# Patient Record
Sex: MALE | Race: WHITE | NOT HISPANIC OR LATINO | URBAN - METROPOLITAN AREA
[De-identification: names, ages, dates, MRNs, and addresses within clinical notes are randomized per-mention and may not be internally consistent; named-entity substitution may affect disease eponyms.]

---

## 2023-10-30 ENCOUNTER — HOSPITAL ENCOUNTER (EMERGENCY)
Facility: HOSPITAL | Age: 48
Discharge: HOME | End: 2023-10-30
Attending: EMERGENCY MEDICINE | Admitting: EMERGENCY MEDICINE

## 2023-10-30 VITALS
HEART RATE: 89 BPM | HEIGHT: 73 IN | BODY MASS INDEX: 22.53 KG/M2 | SYSTOLIC BLOOD PRESSURE: 130 MMHG | RESPIRATION RATE: 16 BRPM | OXYGEN SATURATION: 98 % | DIASTOLIC BLOOD PRESSURE: 88 MMHG | TEMPERATURE: 98 F | WEIGHT: 170 LBS

## 2023-10-30 DIAGNOSIS — R45.851 SUICIDAL IDEATION: Primary | ICD-10-CM

## 2023-10-30 LAB
ALBUMIN SERPL-MCNC: 4.6 G/DL (ref 3.5–5.2)
ALBUMIN/GLOB SERPL: 2.1 G/DL
ALP SERPL-CCNC: 47 U/L (ref 39–117)
ALT SERPL W P-5'-P-CCNC: 24 U/L (ref 1–41)
AMPHET+METHAMPHET UR QL: NEGATIVE
AMPHETAMINES UR QL: NEGATIVE
ANION GAP SERPL CALCULATED.3IONS-SCNC: 10 MMOL/L (ref 5–15)
APAP SERPL-MCNC: <5 MCG/ML (ref 0–30)
AST SERPL-CCNC: 22 U/L (ref 1–40)
BARBITURATES UR QL SCN: NEGATIVE
BASOPHILS # BLD AUTO: 0.01 10*3/MM3 (ref 0–0.2)
BASOPHILS NFR BLD AUTO: 0.2 % (ref 0–1.5)
BENZODIAZ UR QL SCN: NEGATIVE
BILIRUB SERPL-MCNC: 0.4 MG/DL (ref 0–1.2)
BILIRUB UR QL STRIP: NEGATIVE
BUN SERPL-MCNC: 12 MG/DL (ref 6–20)
BUN/CREAT SERPL: 13.3 (ref 7–25)
BUPRENORPHINE SERPL-MCNC: NEGATIVE NG/ML
CALCIUM SPEC-SCNC: 9.3 MG/DL (ref 8.6–10.5)
CANNABINOIDS SERPL QL: NEGATIVE
CHLORIDE SERPL-SCNC: 103 MMOL/L (ref 98–107)
CLARITY UR: CLEAR
CO2 SERPL-SCNC: 24 MMOL/L (ref 22–29)
COCAINE UR QL: NEGATIVE
COLOR UR: YELLOW
CREAT SERPL-MCNC: 0.9 MG/DL (ref 0.76–1.27)
DEPRECATED RDW RBC AUTO: 41.1 FL (ref 37–54)
EGFRCR SERPLBLD CKD-EPI 2021: 106.7 ML/MIN/1.73
EOSINOPHIL # BLD AUTO: 0.02 10*3/MM3 (ref 0–0.4)
EOSINOPHIL NFR BLD AUTO: 0.3 % (ref 0.3–6.2)
ERYTHROCYTE [DISTWIDTH] IN BLOOD BY AUTOMATED COUNT: 12 % (ref 12.3–15.4)
ETHANOL BLD-MCNC: <10 MG/DL (ref 0–10)
FENTANYL UR-MCNC: NEGATIVE NG/ML
GLOBULIN UR ELPH-MCNC: 2.2 GM/DL
GLUCOSE SERPL-MCNC: 108 MG/DL (ref 65–99)
GLUCOSE UR STRIP-MCNC: NEGATIVE MG/DL
HCT VFR BLD AUTO: 40.8 % (ref 37.5–51)
HGB BLD-MCNC: 14 G/DL (ref 13–17.7)
HGB UR QL STRIP.AUTO: NEGATIVE
HOLD SPECIMEN: NORMAL
IMM GRANULOCYTES # BLD AUTO: 0.01 10*3/MM3 (ref 0–0.05)
IMM GRANULOCYTES NFR BLD AUTO: 0.2 % (ref 0–0.5)
KETONES UR QL STRIP: NEGATIVE
LEUKOCYTE ESTERASE UR QL STRIP.AUTO: NEGATIVE
LYMPHOCYTES # BLD AUTO: 1.76 10*3/MM3 (ref 0.7–3.1)
LYMPHOCYTES NFR BLD AUTO: 28.1 % (ref 19.6–45.3)
MCH RBC QN AUTO: 31.9 PG (ref 26.6–33)
MCHC RBC AUTO-ENTMCNC: 34.3 G/DL (ref 31.5–35.7)
MCV RBC AUTO: 92.9 FL (ref 79–97)
METHADONE UR QL SCN: NEGATIVE
MONOCYTES # BLD AUTO: 0.41 10*3/MM3 (ref 0.1–0.9)
MONOCYTES NFR BLD AUTO: 6.5 % (ref 5–12)
NEUTROPHILS NFR BLD AUTO: 4.06 10*3/MM3 (ref 1.7–7)
NEUTROPHILS NFR BLD AUTO: 64.7 % (ref 42.7–76)
NITRITE UR QL STRIP: NEGATIVE
NRBC BLD AUTO-RTO: 0 /100 WBC (ref 0–0.2)
OPIATES UR QL: NEGATIVE
OXYCODONE UR QL SCN: NEGATIVE
PCP UR QL SCN: NEGATIVE
PH UR STRIP.AUTO: 6 [PH] (ref 5–8)
PLATELET # BLD AUTO: 240 10*3/MM3 (ref 140–450)
PMV BLD AUTO: 9.7 FL (ref 6–12)
POTASSIUM SERPL-SCNC: 3.7 MMOL/L (ref 3.5–5.2)
PROPOXYPH UR QL: NEGATIVE
PROT SERPL-MCNC: 6.8 G/DL (ref 6–8.5)
PROT UR QL STRIP: NEGATIVE
RBC # BLD AUTO: 4.39 10*6/MM3 (ref 4.14–5.8)
SALICYLATES SERPL-MCNC: 0.3 MG/DL
SODIUM SERPL-SCNC: 137 MMOL/L (ref 136–145)
SP GR UR STRIP: 1.02 (ref 1–1.03)
TRICYCLICS UR QL SCN: NEGATIVE
UROBILINOGEN UR QL STRIP: NORMAL
WBC NRBC COR # BLD: 6.27 10*3/MM3 (ref 3.4–10.8)
WHOLE BLOOD HOLD COAG: NORMAL
WHOLE BLOOD HOLD SPECIMEN: NORMAL

## 2023-10-30 PROCEDURE — 80179 DRUG ASSAY SALICYLATE: CPT | Performed by: EMERGENCY MEDICINE

## 2023-10-30 PROCEDURE — 36415 COLL VENOUS BLD VENIPUNCTURE: CPT

## 2023-10-30 PROCEDURE — 80053 COMPREHEN METABOLIC PANEL: CPT | Performed by: EMERGENCY MEDICINE

## 2023-10-30 PROCEDURE — 80143 DRUG ASSAY ACETAMINOPHEN: CPT | Performed by: EMERGENCY MEDICINE

## 2023-10-30 PROCEDURE — 80307 DRUG TEST PRSMV CHEM ANLYZR: CPT | Performed by: EMERGENCY MEDICINE

## 2023-10-30 PROCEDURE — 99285 EMERGENCY DEPT VISIT HI MDM: CPT

## 2023-10-30 PROCEDURE — 82077 ASSAY SPEC XCP UR&BREATH IA: CPT | Performed by: EMERGENCY MEDICINE

## 2023-10-30 PROCEDURE — 85025 COMPLETE CBC W/AUTO DIFF WBC: CPT | Performed by: EMERGENCY MEDICINE

## 2023-10-30 PROCEDURE — 81003 URINALYSIS AUTO W/O SCOPE: CPT | Performed by: EMERGENCY MEDICINE

## 2023-10-30 RX ORDER — SODIUM CHLORIDE 0.9 % (FLUSH) 0.9 %
10 SYRINGE (ML) INJECTION AS NEEDED
Status: DISCONTINUED | OUTPATIENT
Start: 2023-10-30 | End: 2023-10-30 | Stop reason: HOSPADM

## 2023-10-30 NOTE — ED PROVIDER NOTES
Subjective   History of Present Illness  Patient is a pleasant 48-year-old male with a history of anxiety who presents to the emergency department with suicidal ideation.  He states that they recently moved from [place] back in June and since the weather changed and has become more cloudy his depression and anxiety has worsened.  He states that this evening he was at the house with his children and he felt very depressed and did have thoughts of hurting himself.  He has never presented to the emergency department or any anywhere else with these thoughts before.  He says he tried to call a couple people but when people did not answer he decided to come to the emergency department.  Denies any overdose or cutting or any attempts in the past.  He states that he did have a specific plan at home but he does not want to share with the plan was.  Denies fever, chills, chest pain, shortness of breath, abdominal pain, vomiting, diarrhea, or other acute complaint.        Review of Systems   All other systems reviewed and are negative.      No past medical history on file.    No Known Allergies    No past surgical history on file.    No family history on file.    Social History     Socioeconomic History    Marital status:            Objective   Physical Exam  Vitals and nursing note reviewed.   Constitutional:       General: He is not in acute distress.     Appearance: Normal appearance.   HENT:      Head: Normocephalic and atraumatic.   Eyes:      Conjunctiva/sclera: Conjunctivae normal.      Pupils: Pupils are equal, round, and reactive to light.   Neck:      Thyroid: No thyromegaly.   Cardiovascular:      Rate and Rhythm: Normal rate and regular rhythm.      Heart sounds: Normal heart sounds. No murmur heard.     No friction rub. No gallop.   Pulmonary:      Effort: Pulmonary effort is normal. No respiratory distress.      Breath sounds: Normal breath sounds.   Abdominal:      General: Bowel sounds are normal.       Palpations: Abdomen is soft.      Tenderness: There is no abdominal tenderness.   Musculoskeletal:         General: Normal range of motion.      Cervical back: Normal range of motion and neck supple.   Lymphadenopathy:      Cervical: No cervical adenopathy.   Skin:     General: Skin is warm and dry.   Neurological:      Mental Status: He is alert and oriented to person, place, and time.   Psychiatric:         Mood and Affect: Mood is anxious and depressed.         Procedures           ED Course      Recent Results (from the past 24 hour(s))   Comprehensive Metabolic Panel    Collection Time: 10/30/23  6:54 PM    Specimen: Blood   Result Value Ref Range    Glucose 108 (H) 65 - 99 mg/dL    BUN 12 6 - 20 mg/dL    Creatinine 0.90 0.76 - 1.27 mg/dL    Sodium 137 136 - 145 mmol/L    Potassium 3.7 3.5 - 5.2 mmol/L    Chloride 103 98 - 107 mmol/L    CO2 24.0 22.0 - 29.0 mmol/L    Calcium 9.3 8.6 - 10.5 mg/dL    Total Protein 6.8 6.0 - 8.5 g/dL    Albumin 4.6 3.5 - 5.2 g/dL    ALT (SGPT) 24 1 - 41 U/L    AST (SGOT) 22 1 - 40 U/L    Alkaline Phosphatase 47 39 - 117 U/L    Total Bilirubin 0.4 0.0 - 1.2 mg/dL    Globulin 2.2 gm/dL    A/G Ratio 2.1 g/dL    BUN/Creatinine Ratio 13.3 7.0 - 25.0    Anion Gap 10.0 5.0 - 15.0 mmol/L    eGFR 106.7 >60.0 mL/min/1.73   Acetaminophen Level    Collection Time: 10/30/23  6:54 PM    Specimen: Blood   Result Value Ref Range    Acetaminophen <5.0 0.0 - 30.0 mcg/mL   Ethanol    Collection Time: 10/30/23  6:54 PM    Specimen: Blood   Result Value Ref Range    Ethanol <10 0 - 10 mg/dL   Salicylate Level    Collection Time: 10/30/23  6:54 PM    Specimen: Blood   Result Value Ref Range    Salicylate 0.3 <=30.0 mg/dL   Green Top (Gel)    Collection Time: 10/30/23  6:54 PM   Result Value Ref Range    Extra Tube Hold for add-ons.    Lavender Top    Collection Time: 10/30/23  6:54 PM   Result Value Ref Range    Extra Tube hold for add-on    Gold Top - SST    Collection Time: 10/30/23  6:54 PM   Result  Value Ref Range    Extra Tube Hold for add-ons.    Gray Top    Collection Time: 10/30/23  6:54 PM   Result Value Ref Range    Extra Tube Hold for add-ons.    Light Blue Top    Collection Time: 10/30/23  6:54 PM   Result Value Ref Range    Extra Tube Hold for add-ons.    CBC Auto Differential    Collection Time: 10/30/23  6:54 PM    Specimen: Blood   Result Value Ref Range    WBC 6.27 3.40 - 10.80 10*3/mm3    RBC 4.39 4.14 - 5.80 10*6/mm3    Hemoglobin 14.0 13.0 - 17.7 g/dL    Hematocrit 40.8 37.5 - 51.0 %    MCV 92.9 79.0 - 97.0 fL    MCH 31.9 26.6 - 33.0 pg    MCHC 34.3 31.5 - 35.7 g/dL    RDW 12.0 (L) 12.3 - 15.4 %    RDW-SD 41.1 37.0 - 54.0 fl    MPV 9.7 6.0 - 12.0 fL    Platelets 240 140 - 450 10*3/mm3    Neutrophil % 64.7 42.7 - 76.0 %    Lymphocyte % 28.1 19.6 - 45.3 %    Monocyte % 6.5 5.0 - 12.0 %    Eosinophil % 0.3 0.3 - 6.2 %    Basophil % 0.2 0.0 - 1.5 %    Immature Grans % 0.2 0.0 - 0.5 %    Neutrophils, Absolute 4.06 1.70 - 7.00 10*3/mm3    Lymphocytes, Absolute 1.76 0.70 - 3.10 10*3/mm3    Monocytes, Absolute 0.41 0.10 - 0.90 10*3/mm3    Eosinophils, Absolute 0.02 0.00 - 0.40 10*3/mm3    Basophils, Absolute 0.01 0.00 - 0.20 10*3/mm3    Immature Grans, Absolute 0.01 0.00 - 0.05 10*3/mm3    nRBC 0.0 0.0 - 0.2 /100 WBC   Urine Drug Screen - Urine, Clean Catch    Collection Time: 10/30/23  7:43 PM    Specimen: Urine, Clean Catch   Result Value Ref Range    THC, Screen, Urine Negative Negative    Phencyclidine (PCP), Urine Negative Negative    Cocaine Screen, Urine Negative Negative    Methamphetamine, Ur Negative Negative    Opiate Screen Negative Negative    Amphetamine Screen, Urine Negative Negative    Benzodiazepine Screen, Urine Negative Negative    Tricyclic Antidepressants Screen Negative Negative    Methadone Screen, Urine Negative Negative    Barbiturates Screen, Urine Negative Negative    Oxycodone Screen, Urine Negative Negative    Propoxyphene Screen Negative Negative    Buprenorphine, Screen,  "Urine Negative Negative   Urinalysis With Microscopic If Indicated (No Culture) - Urine, Clean Catch    Collection Time: 10/30/23  7:43 PM    Specimen: Urine, Clean Catch   Result Value Ref Range    Color, UA Yellow Yellow, Straw    Appearance, UA Clear Clear    pH, UA 6.0 5.0 - 8.0    Specific Gravity, UA 1.017 1.001 - 1.030    Glucose, UA Negative Negative    Ketones, UA Negative Negative    Bilirubin, UA Negative Negative    Blood, UA Negative Negative    Protein, UA Negative Negative    Leuk Esterase, UA Negative Negative    Nitrite, UA Negative Negative    Urobilinogen, UA 1.0 E.U./dL 0.2 - 1.0 E.U./dL   Fentanyl, Urine - Urine, Clean Catch    Collection Time: 10/30/23  7:43 PM    Specimen: Urine, Clean Catch   Result Value Ref Range    Fentanyl, Urine Negative Negative     Note: In addition to lab results from this visit, the labs listed above may include labs taken at another facility or during a different encounter within the last 24 hours. Please correlate lab times with ED admission and discharge times for further clarification of the services performed during this visit.    No orders to display     Vitals:    10/30/23 1847 10/30/23 1914   BP: 128/96 130/88   BP Location:  Left arm   Patient Position:  Sitting   Pulse: 80 89   Resp: 18 16   Temp: 98 °F (36.7 °C)    TempSrc: Oral    SpO2: 98% 98%   Weight: 77.1 kg (170 lb) 77.1 kg (170 lb)   Height: 182.9 cm (72\") 185.4 cm (73\")     Medications - No data to display  ECG/EMG Results (last 24 hours)       ** No results found for the last 24 hours. **          No orders to display                                            Medical Decision Making  Patient received psychiatry evaluation.  He was deemed most appropriate for outpatient management.  Both he and his family are comfortable with this plan and I am comfortable with this plan also.  Patient is feeling better at this time and has good family support and will have constant monitoring.  He does not have " access to firearms in the house.  He is been given a list of local resources.  He understands to have a low threshold to return to the emergency department if symptoms persist, worsen, or other concerns arise.    Problems Addressed:  Suicidal ideation: complicated acute illness or injury    Amount and/or Complexity of Data Reviewed  Labs: ordered. Decision-making details documented in ED Course.  ECG/medicine tests: ordered and independent interpretation performed. Decision-making details documented in ED Course.    Risk  Prescription drug management.        Final diagnoses:   Suicidal ideation       ED Disposition  ED Disposition       ED Disposition   Discharge    Condition   Stable    Comment   --           DISCHARGE    Patient discharged in stable condition.    Reviewed implications of results, diagnosis, meds, responsibility to follow up, warning signs and symptoms of possible worsening, potential complications and reasons to return to ER.    Patient/Family voiced understanding of above instructions.    Discussed plan for discharge, as there is no emergent indication for admission.  Pt/family is agreeable and understands need for follow up and possible repeat testing.  Pt/family is aware that discharge does not mean that nothing is wrong but that it indicates no emergency is currently present that requires admission and they must continue care with follow-up as given below or with a physician of their choice.     FOLLOW-UP  [facility]  [address]  [facility] [address] [phone]  Schedule an appointment as soon as possible for a visit       [facility] EMERGENCY DEPARTMENT  [address] [facility] [address] [phone]    If symptoms worsen    Follow-up with the resources on the handout provided by the psychiatry evaluator.    Schedule an appointment as soon as possible for a visit            Medication List      No changes were made to your  prescriptions during this visit.            Lc Rausch DO  10/31/23 0320

## 2023-10-31 NOTE — DISCHARGE INSTRUCTIONS
Follow-up with one of the many resources that she was given this evening.  Is important that you do this tomorrow morning.  Stay with family tonight and until therapy is initiated and successful.  As discussed, very low threshold to return to the emergency department if your harmful thoughts return or other concerns arise.

## 2023-10-31 NOTE — CONSULTS
Nikita Moorejerson  1975    Preferred Pronouns: He/Him   Race/Ethnicity: White or   Martial Status:   Guardian Name/Contact/etc: Self  Pt Lives With:  Wife and 2 children  Occupation: unemployed  Appearance: disheveled, unkempt     Time Called for Assessment: 20:38 ( all labs had been completed and resulted at this time)   Assessment Start and End: 20:40-21:00    This provider is located at the [facility] located at [address] [place] [address], [facility] using a secure Zoom Video Visit. Patient is being seen remotely via telehealth at [address] [place] [address], [id number], and stated they are in a secure environment for this session. The patient's condition being diagnosed/treated is appropriate for telemedicine. The provider identified themselves as well as their credentials.   The patient, and/or patients guardian, consent to be seen remotely, and when consent is given they understand that the consent allows for patient identifiable information to be sent to a third party as needed.   They may refuse to be seen remotely at any time. The electronic data is encrypted and password protected, and the patient and/or guardian has been advised of the potential risks to privacy not withstanding such measures.”          DATA:   Clinician received a call from [facility] staff for a behavioral health consult.  The patient is agreeable to speak with the behavioral health team.  Met with patient at bedside. Patient is under 1:1 security monitoring Security released after assessment.  The attending treatment team is Yao Thomas RN and DR. Rausch, Provider.  Patient presents today with chief compliant of anxiety and Self harm . Clinician completed assessment with patient and observations are documented as follows.    ASSESSMENT:    Clinician consulted with patient for mental status exam and assessment.  Clinical descriptors are documented as follows.  Clinician completed CSSRS  with patient for suicide risk assessment.  The results of patient’s CSSRS documented as follows.      Therapist provided informed consent to patient explaining that confidentiality cannot be maintained if patient states intent, thought or plan to harm themself, someone else or if someone had harmed or plans to harm them. Patient stated understanding and consented for assessment.      Presenting Problems: Patient reported that his anxiety increased due to cloudy and gloomy weather and it started making him have self harm thoughts. Patient stated that he was at home with his 2 children and thought it best to come into the ER. Patient stated that he has been trying to find a job, a stable job, and it is causing him stress. Patient stated that he just moved a few months ago from [place] and moved away from family.  Patient denied any suicidal thoughts, plan or intent during assessment, Patient denied HI and AVH.    Current Stressors: Finances, moving away from family     Established Therapy, Medication Management or Other Mental Health Services: Patient does not currently have any Mental Health services in place at this time. Patient does not currently take any psychiatric medications.     Current Psychiatric Medications: None      Mental Status Exam:  Behavior: Anxious  Psychomotor Movement: Appropriate  Attention and Cooperation: Normal and Cooperative  Mood: anxious and Affect: Appropriate  Orientation: alert and oriented to person, place, and time   Thought Process: linear, logical, and goal directed  Thought Content: normal  Delusions:  None    Hallucinations: None   Concentration: Normal  Suicidal Ideation: Absent  Homicidal Ideation: Absent  Hopelessness: no  Speech: Normal  Eye Contact: Fair  Insight: Fair  Judgement: Fair    Depression: 5   Anxiety: 6  Sleep: Interrupted   Appetite: Good       Hx of Psychiatric or Detox Hospitalizations:  No  Most recent inpatient admission: N/A    Suicidal Ideation  Assessment:    [place]-SUICIDE SEVERITY RATING SCALE  Psychiatric Inpatient Setting - Discharge Screener    Ask questions that are bold and underlined Discharge   Ask Questions 1 and 2 YES NO   Wish to be Dead:   Person endorses thoughts about a wish to be dead or not alive anymore, or wish to fall asleep and not wake up.  While you were here in the hospital, have you wished you were dead or wished you could go to sleep and not wake up?  X   Suicidal Thoughts:   General non-specific thoughts of wanting to end one's life/die by suicide, “I've thought about killing myself” without general thoughts of ways to kill oneself/associated methods, intent, or plan.   While you were here in the hospital, have you actually had thoughts about killing yourself?   X   If YES to 2, ask questions 3, 4, 5, and 6.  If NO to 2, go directly to question 6   3) Suicidal Thoughts with Method (without Specific Plan or Intent to Act):   Person endorses thoughts of suicide and has thought of a least one method during the assessment period. This is different than a specific plan with time, place or method details worked out. “I thought about taking an overdose but I never made a specific plan as to when where or how I would actually do it….and I would never go through with it.”   Have you been thinking about how you might kill yourself?      4) Suicidal Intent (without Specific Plan):   Active suicidal thoughts of killing oneself and patient reports having some intent to act on such thoughts, as opposed to “I have the thoughts but I definitely will not do anything about them.”   Have you had these thoughts and had some intention of acting on them or do you have some intention of acting on them after you leave the hospital?      5) Suicide Intent with Specific Plan:   Thoughts of killing oneself with details of plan fully or partially worked out and person has some intent to carry it out.   Have you started to work out or worked out the  details of how to kill yourself either for while you were here in the hospital or for after you leave the hospital? Do you intend to carry out this plan?        6) Suicide Behavior    While you were here in the hospital, have you done anything, started to do anything, or prepared to do anything to end your life?    Examples: Took pills, cut yourself, tried to hang yourself, took out pills but didn't swallow any because you changed your mind or someone took them from you, collected pills, secured a means of obtaining a gun, gave away valuables, wrote a will or suicide note, etc.  X     Suicidal: Absent (If present, describe frequency of thoughts, patient's perception of impulse control, plan or behavior details, etc)  Previous Attempts:  none  Most Recent Attempt: N/A    Psychosocial History    Highest Level of Education:  Patient did not disclose    Family Hx of Mental Health/Substance Abuse: Patient stated that while living in [facility] his father would get angry.  Patient Trauma/Abuse History: Further details: No details or report given     Does this require reporting: No  Patient Identified Support System (List family members, loved ones, guardians, friends, etc): Wife    Legal History / History of Violence: Denies significant history of legal issues.   Experience with Interpersonal Violence: No  History of Inappropriate Sexual Behavior: No  Current Medical Conditions or Biomedical Complications: No (If yes, explain/list)    Social Determinants of Health  Housing Instability and/or Utility Needs: No  Food Insecurity: No  Transportation Needs: No    Substance Use History    History of Use: Patient denied any history of substance use    PLAN:  At this time, clinician recommends outpatient treatment based upon the above assessment.   Clinician collaborated with the treatment team who agree to adopt the recommendations.  Clinician discussed recommendations with patient and/or patient support systems, and patient is  agreeable to the plan.  Patient is agreeable for referrals to be sent to facilities and agencies for treatment.    Have the levels of care been discussed with the patient? Yes  Level of care recommendation: Outpatient   Is patient agreeable to treatment? Yes    Safety Planning:  Does the patient have access to weapons or firearms? No  Did clinician discuss securing weapons, firearms, sharps and/or medications with the patient? Yes  Safety Plan: Clinician verbally engaged in safety planning by assisting the patient in identifying risk factors that would indicate the need for higher level of care, such as thoughts to harm self or others and/or self-harming behavior(s). Safety plan of report to nearest hospital, calling local police or 911 if feeling unsafe, if having suicidal or homicidal thoughts, or if in emergent need of medications verbally reviewed with patient during assessment and suicide prevention/crisis hotlines verbally reviewed with patient during assessment. Patient during assessment verbally agreed to safety plan. Reviewed resources of crisis hotlines or presenting to the nearest emergency department should symptoms worsen, or in any crisis/emergency. Patient agreeable and voiced understanding.      Patient does not present with criteria to warrant an involuntary psychiatric hold at this time as they are not endorsing active suicidal ideation with plan/intent, homicidal ideation with plan/intent or displaying symptoms of an acute psychotic episode without ability to appropriately plan for safety at a lesser restrictive level of care.  The patient identified proactive factors and is agreeable to establish/engage in outpatient behavioral health services.  Clinician provided resources for outpatient services and discussed the availability of emergency behavioral health services 24/7 through the [facility].  Clinician verbally engaged in safety planning by assisting the patient in identifying risk factors  that would indicate the need for higher level of care, such as thoughts to harm self or others and/or self-harming behavior(s). Safety plan of report to nearest hospital, calling local police or 911 if feeling unsafe, if having suicidal or homicidal thoughts, or if in emergent need of medications verbally reviewed with patient during assessment and suicide prevention/crisis hotlines verbally reviewed with patient during assessment. Patient during assessment verbally agreed to safety plan. Reviewed resources of crisis hotlines or presenting to the nearest emergency department should symptoms worsen, or in any crisis/emergency. Patient agreeable and voiced understanding.       SIGNATURE  Fabricio Terry MA [facility]  10/30/2023

## 2024-03-04 ENCOUNTER — OFFICE VISIT (OUTPATIENT)
Dept: FAMILY MEDICINE CLINIC | Facility: CLINIC | Age: 49
End: 2024-03-04
Payer: COMMERCIAL

## 2024-03-04 VITALS
WEIGHT: 175.6 LBS | TEMPERATURE: 98.6 F | BODY MASS INDEX: 23.27 KG/M2 | SYSTOLIC BLOOD PRESSURE: 110 MMHG | HEART RATE: 85 BPM | DIASTOLIC BLOOD PRESSURE: 64 MMHG | HEIGHT: 73 IN | OXYGEN SATURATION: 99 %

## 2024-03-04 DIAGNOSIS — F41.1 GAD (GENERALIZED ANXIETY DISORDER): Primary | ICD-10-CM

## 2024-03-04 DIAGNOSIS — F32.1 MODERATE MAJOR DEPRESSION: ICD-10-CM

## 2024-03-04 PROBLEM — F32.A ANXIETY AND DEPRESSION: Status: ACTIVE | Noted: 2024-03-04

## 2024-03-04 PROBLEM — F41.9 ANXIETY AND DEPRESSION: Status: ACTIVE | Noted: 2024-03-04

## 2024-03-04 PROCEDURE — 1160F RVW MEDS BY RX/DR IN RCRD: CPT | Performed by: STUDENT IN AN ORGANIZED HEALTH CARE EDUCATION/TRAINING PROGRAM

## 2024-03-04 PROCEDURE — 99204 OFFICE O/P NEW MOD 45 MIN: CPT | Performed by: STUDENT IN AN ORGANIZED HEALTH CARE EDUCATION/TRAINING PROGRAM

## 2024-03-04 PROCEDURE — 1159F MED LIST DOCD IN RCRD: CPT | Performed by: STUDENT IN AN ORGANIZED HEALTH CARE EDUCATION/TRAINING PROGRAM

## 2024-03-04 RX ORDER — ESCITALOPRAM OXALATE 10 MG/1
10 TABLET ORAL DAILY
Qty: 30 TABLET | Refills: 1 | Status: SHIPPED | OUTPATIENT
Start: 2024-03-04

## 2024-03-04 RX ORDER — BUSPIRONE HYDROCHLORIDE 10 MG/1
10 TABLET ORAL 3 TIMES DAILY PRN
Qty: 90 TABLET | Refills: 1 | Status: SHIPPED | OUTPATIENT
Start: 2024-03-04

## 2024-03-04 NOTE — ASSESSMENT & PLAN NOTE
- PHQ-9 of 12, uncontrolled  - Patient did have a history of SI in the past and did end up going to the ER in the fall 2023, denies any current SI  - I did give him ER precautions  - Will start Lexapro 10 mg at bedtime, discussed ER precautions and return precautions if symptoms were to worsen on this medicine  - Again, I advised patient to have counseling but he declines at this time

## 2024-03-04 NOTE — PROGRESS NOTES
"    Office Note     Name: Nikita Pires    : 1975     MRN: [id number]     Chief Complaint  Anxiety    Subjective     History of Present Illness:  Nikita Pires is a 49 y.o. male who presents today for anxiety and depression.  He is here with his wife today.  He moved to [place] several months ago and reports that he has had trouble with anxiety and depression since.  A lot of it is money related.  He reports that when he lived in [place], he did not have any issues with his job.  It has been slow here.  Patient did have SI back in the fall of last year and ended up going to the emergency room.  He would not disclose what his active plan was.  He reports that he does not have any active thoughts to hurt himself or anyone else at this time but does wish that he would disappear.  He has panic attacks that last several hours and sometimes several days.  He paces around, his heart rate goes up and he starts sweating.  His wife said that he cannot hear her when she is talking to him.  She tries to calm him down and take his mind off of what is going on.  He reports that the weather affects his mental state.  Anytime that range was dark and cloudy, his symptoms get worse.  He has tried BuSpar in the past which seem to help with his anxiety.  He has also tried something for sleep but is not sure what it was.  He reports that he has always had issues with sleep.  He does have a family history of anxiety and depression.  He had a sister who posted SI on social media in the past.  Patient is not interested in counseling at this time.          Objective     History reviewed. No pertinent past medical history.  History reviewed. No pertinent surgical history.  History reviewed. No pertinent family history.    Vital Signs  /64   Pulse 85   Temp 98.6 °F (37 °C)   Ht 185.4 cm (73\")   Wt 79.7 kg (175 lb 9.6 oz)   SpO2 99%   BMI 23.17 kg/m²   Estimated body mass index is 23.17 kg/m² as calculated from the " "following:    Height as of this encounter: 185.4 cm (73\").    Weight as of this encounter: 79.7 kg (175 lb 9.6 oz).    Physical Exam  Vitals reviewed.   Constitutional:       Appearance: Normal appearance.   HENT:      Head: Normocephalic.      Nose: Nose normal.   Eyes:      Conjunctiva/sclera: Conjunctivae normal.   Cardiovascular:      Rate and Rhythm: Normal rate and regular rhythm.   Pulmonary:      Effort: Pulmonary effort is normal.   Abdominal:      General: Abdomen is flat.      Palpations: Abdomen is soft.   Musculoskeletal:      Comments: Moving all extremities spontaneously   Skin:     General: Skin is warm and dry.   Neurological:      Mental Status: He is alert.      Comments: Alert and oriented   Psychiatric:         Behavior: Behavior normal.      Comments: Anxious                   Assessment and Plan     Diagnoses and all orders for this visit:    1. ROCIO (generalized anxiety disorder) (Primary)  Assessment & Plan:  - ROCIO-7 of 17, uncontrolled  - Patient is very anxious on exam today does have panic attacks, mostly stemming from financial issues  - I did discuss with him getting counseling but he declines at this time  - We will start BuSpar 10 mg up to 3 times a day as needed, patient did not want to take this on a daily basis but does report that it has helped with panic attacks in the past      Orders:  -     busPIRone (BUSPAR) 10 MG tablet; Take 1 tablet by mouth 3 (Three) Times a Day As Needed (anxiety).  Dispense: 90 tablet; Refill: 1    2. Moderate major depression  Assessment & Plan:  - PHQ-9 of 12, uncontrolled  - Patient did have a history of SI in the past and did end up going to the ER in the fall 2023, denies any current SI  - I did give him ER precautions  - Will start Lexapro 10 mg at bedtime, discussed ER precautions and return precautions if symptoms were to worsen on this medicine  - Again, I advised patient to have counseling but he declines at this time    Orders:  -     " escitalopram (Lexapro) 10 MG tablet; Take 1 tablet by mouth Daily.  Dispense: 30 tablet; Refill: 1      BMI is within normal parameters. No other follow-up for BMI required.    Follow Up  Return in about 4 weeks (around 4/1/2024) for anxiety and depression.    Emma Mcknight MD

## 2024-03-04 NOTE — ASSESSMENT & PLAN NOTE
- ROCIO-7 of 17, uncontrolled  - Patient is very anxious on exam today does have panic attacks, mostly stemming from financial issues  - I did discuss with him getting counseling but he declines at this time  - We will start BuSpar 10 mg up to 3 times a day as needed, patient did not want to take this on a daily basis but does report that it has helped with panic attacks in the past

## 2024-04-01 ENCOUNTER — LAB (OUTPATIENT)
Dept: LAB | Facility: HOSPITAL | Age: 49
End: 2024-04-01
Payer: COMMERCIAL

## 2024-04-01 ENCOUNTER — OFFICE VISIT (OUTPATIENT)
Dept: FAMILY MEDICINE CLINIC | Facility: CLINIC | Age: 49
End: 2024-04-01
Payer: COMMERCIAL

## 2024-04-01 ENCOUNTER — TELEPHONE (OUTPATIENT)
Dept: FAMILY MEDICINE CLINIC | Facility: CLINIC | Age: 49
End: 2024-04-01

## 2024-04-01 VITALS
SYSTOLIC BLOOD PRESSURE: 120 MMHG | TEMPERATURE: 98.7 F | DIASTOLIC BLOOD PRESSURE: 86 MMHG | OXYGEN SATURATION: 100 % | WEIGHT: 170.8 LBS | BODY MASS INDEX: 22.64 KG/M2 | HEART RATE: 88 BPM | HEIGHT: 73 IN

## 2024-04-01 DIAGNOSIS — G47.00 INSOMNIA, UNSPECIFIED TYPE: ICD-10-CM

## 2024-04-01 DIAGNOSIS — R79.89 LOW TESTOSTERONE IN MALE: Primary | ICD-10-CM

## 2024-04-01 DIAGNOSIS — F32.1 MODERATE MAJOR DEPRESSION: Primary | ICD-10-CM

## 2024-04-01 DIAGNOSIS — R79.89 LOW TESTOSTERONE IN MALE: ICD-10-CM

## 2024-04-01 DIAGNOSIS — F41.1 GAD (GENERALIZED ANXIETY DISORDER): ICD-10-CM

## 2024-04-01 PROCEDURE — 1160F RVW MEDS BY RX/DR IN RCRD: CPT | Performed by: STUDENT IN AN ORGANIZED HEALTH CARE EDUCATION/TRAINING PROGRAM

## 2024-04-01 PROCEDURE — 99214 OFFICE O/P EST MOD 30 MIN: CPT | Performed by: STUDENT IN AN ORGANIZED HEALTH CARE EDUCATION/TRAINING PROGRAM

## 2024-04-01 PROCEDURE — 1159F MED LIST DOCD IN RCRD: CPT | Performed by: STUDENT IN AN ORGANIZED HEALTH CARE EDUCATION/TRAINING PROGRAM

## 2024-04-01 RX ORDER — ESCITALOPRAM OXALATE 10 MG/1
10 TABLET ORAL DAILY
Qty: 30 TABLET | Refills: 1 | Status: SHIPPED | OUTPATIENT
Start: 2024-04-01

## 2024-04-01 RX ORDER — TRAZODONE HYDROCHLORIDE 50 MG/1
50 TABLET ORAL NIGHTLY
Qty: 30 TABLET | Refills: 0 | Status: SHIPPED | OUTPATIENT
Start: 2024-04-01

## 2024-04-01 NOTE — ASSESSMENT & PLAN NOTE
- ROCIO-7 of 7, down from 17 4 weeks ago, improving  - Continue Lexapro 10 mg daily and BuSpar as needed

## 2024-04-01 NOTE — ASSESSMENT & PLAN NOTE
- PHQ-9 of 6, down from 12, improving, denies any SI/HI currently   - Patient did have a history of SI in the past and did end up going to the ER in the fall 2023, denies any current SI  - Continue Lexapro 10 mg daily

## 2024-04-01 NOTE — TELEPHONE ENCOUNTER
Can someone please call the patient and let him know that the testosterone level that was obtained today needed to be redrawn.  He can stop by the lab at his convenience to get that level between 8 and 10 AM.  Alternatively, he can wait until his next appointment.  But as we discussed, he will need 3 levels total so he will need to come back anyway.

## 2024-04-01 NOTE — ASSESSMENT & PLAN NOTE
- Patient reports a history of low testosterone in the past  - Obtaining testosterone level today, will follow-up on results and advise further

## 2024-04-01 NOTE — PROGRESS NOTES
"    Office Note     Name: Nikita Pires    : 1975     MRN: [id number]     Chief Complaint  Depression    Subjective     History of Present Illness:  Nikita Pires is a 49 y.o. male who presents today for insomnia and mood.  He is here with his wife today.    Insomnia: Patient has had a lot of trouble with sleep despite being on the Lexapro.  He has tried melatonin in the past and it has not helped.  The lack of sleep is affecting his mood.    Depression and anxiety: Patient does report that his symptoms have overall improved.  He reports in the last 2 weeks, he has noticed improvement in his symptoms.  He does take the Lexapro 10 mg on a daily basis but takes it in the morning.  He has taken the BuSpar as needed but is taking it less frequently now.  Denies any SI/HI.    He does report a history of low testosterone in the past and would like to get his levels checked today.          Objective     History reviewed. No pertinent past medical history.  History reviewed. No pertinent surgical history.  History reviewed. No pertinent family history.    Vital Signs  /86   Pulse 88   Temp 98.7 °F (37.1 °C) (Infrared)   Ht 185.4 cm (73\")   Wt 77.5 kg (170 lb 12.8 oz)   SpO2 100%   BMI 22.53 kg/m²   Estimated body mass index is 22.53 kg/m² as calculated from the following:    Height as of this encounter: 185.4 cm (73\").    Weight as of this encounter: 77.5 kg (170 lb 12.8 oz).    Physical Exam  Vitals reviewed.   Constitutional:       Appearance: Normal appearance.   HENT:      Head: Normocephalic.   Cardiovascular:      Rate and Rhythm: Normal rate and regular rhythm.   Pulmonary:      Effort: Pulmonary effort is normal.      Breath sounds: Normal breath sounds.   Neurological:      Mental Status: He is alert.               Assessment and Plan     Diagnoses and all orders for this visit:    1. Moderate major depression (Primary)  Assessment & Plan:  - PHQ-9 of 6, down from 12, improving, denies any " SI/HI currently   - Patient did have a history of SI in the past and did end up going to the ER in the fall 2023, denies any current SI  - Continue Lexapro 10 mg daily    Orders:  -     escitalopram (Lexapro) 10 MG tablet; Take 1 tablet by mouth Daily.  Dispense: 30 tablet; Refill: 1    2. ROCIO (generalized anxiety disorder)  Assessment & Plan:  - ROCIO-7 of 7, down from 17 4 weeks ago, improving  - Continue Lexapro 10 mg daily and BuSpar as needed      3. Insomnia, unspecified type  Assessment & Plan:  - Uncontrolled  - We will start trazodone 50 mg at bedtime    Orders:  -     traZODone (DESYREL) 50 MG tablet; Take 1 tablet by mouth Every Night.  Dispense: 30 tablet; Refill: 0    4. Low testosterone in male  Assessment & Plan:  - Patient reports a history of low testosterone in the past  - Obtaining testosterone level today, will follow-up on results and advise further    Orders:  -     Testosterone, Free, Total; Future      BMI is within normal parameters. No other follow-up for BMI required.    Follow Up  Return in about 4 weeks (around 4/29/2024) for mood .    Emma Mcknight MD

## 2024-04-02 NOTE — TELEPHONE ENCOUNTER
"Hub read: \"  Can someone please call the patient and let him know that the testosterone level that was obtained today needed to be redrawn.  He can stop by the lab at his convenience to get that level between 8 and 10 AM.  Alternatively, he can wait until his next appointment.  But as we discussed, he will need 3 levels total so he will need to come back anyway.      \" I tried calling, no vm box set up yet.  "

## 2024-04-19 DIAGNOSIS — F32.1 MODERATE MAJOR DEPRESSION: ICD-10-CM

## 2024-04-19 RX ORDER — ESCITALOPRAM OXALATE 10 MG/1
10 TABLET ORAL DAILY
Qty: 30 TABLET | Refills: 1 | Status: SHIPPED | OUTPATIENT
Start: 2024-04-19

## 2024-04-29 ENCOUNTER — LAB (OUTPATIENT)
Dept: LAB | Facility: HOSPITAL | Age: 49
End: 2024-04-29
Payer: COMMERCIAL

## 2024-04-29 ENCOUNTER — OFFICE VISIT (OUTPATIENT)
Dept: FAMILY MEDICINE CLINIC | Facility: CLINIC | Age: 49
End: 2024-04-29
Payer: COMMERCIAL

## 2024-04-29 VITALS
DIASTOLIC BLOOD PRESSURE: 76 MMHG | HEART RATE: 82 BPM | TEMPERATURE: 98 F | SYSTOLIC BLOOD PRESSURE: 118 MMHG | OXYGEN SATURATION: 98 % | BODY MASS INDEX: 23.19 KG/M2 | HEIGHT: 73 IN | WEIGHT: 175 LBS

## 2024-04-29 DIAGNOSIS — R79.89 LOW TESTOSTERONE IN MALE: ICD-10-CM

## 2024-04-29 DIAGNOSIS — G47.00 INSOMNIA, UNSPECIFIED TYPE: ICD-10-CM

## 2024-04-29 DIAGNOSIS — F41.1 GAD (GENERALIZED ANXIETY DISORDER): ICD-10-CM

## 2024-04-29 DIAGNOSIS — F32.1 MODERATE MAJOR DEPRESSION: ICD-10-CM

## 2024-04-29 PROCEDURE — 84403 ASSAY OF TOTAL TESTOSTERONE: CPT

## 2024-04-29 PROCEDURE — 1160F RVW MEDS BY RX/DR IN RCRD: CPT | Performed by: STUDENT IN AN ORGANIZED HEALTH CARE EDUCATION/TRAINING PROGRAM

## 2024-04-29 PROCEDURE — 1159F MED LIST DOCD IN RCRD: CPT | Performed by: STUDENT IN AN ORGANIZED HEALTH CARE EDUCATION/TRAINING PROGRAM

## 2024-04-29 PROCEDURE — 84402 ASSAY OF FREE TESTOSTERONE: CPT

## 2024-04-29 PROCEDURE — 36415 COLL VENOUS BLD VENIPUNCTURE: CPT

## 2024-04-29 PROCEDURE — 99214 OFFICE O/P EST MOD 30 MIN: CPT | Performed by: STUDENT IN AN ORGANIZED HEALTH CARE EDUCATION/TRAINING PROGRAM

## 2024-04-29 RX ORDER — ESCITALOPRAM OXALATE 10 MG/1
10 TABLET ORAL DAILY
Qty: 90 TABLET | Refills: 1 | Status: SHIPPED | OUTPATIENT
Start: 2024-04-29

## 2024-04-29 RX ORDER — TRAZODONE HYDROCHLORIDE 50 MG/1
50 TABLET ORAL NIGHTLY
Qty: 90 TABLET | Refills: 0 | Status: SHIPPED | OUTPATIENT
Start: 2024-04-29

## 2024-04-29 RX ORDER — BUSPIRONE HYDROCHLORIDE 10 MG/1
10 TABLET ORAL 3 TIMES DAILY PRN
Qty: 90 TABLET | Refills: 2 | Status: SHIPPED | OUTPATIENT
Start: 2024-04-29

## 2024-04-29 NOTE — PROGRESS NOTES
"    Office Note     Name: Nikita Pires    : 1975     MRN: [id number]     Chief Complaint  Depression    Subjective     History of Present Illness:  Nikita Pires is a 49 y.o. male who presents today for anxiety, depression, and sleep.  Patient reports that his symptoms are overall improved.  He would like to continue the Lexapro.  He takes the BuSpar as needed.  The trazodone has helped from time to time.  He does not want to try an alternate medicine.  Denies any SI/HI.          Objective     History reviewed. No pertinent past medical history.  History reviewed. No pertinent surgical history.  History reviewed. No pertinent family history.    Vital Signs  /76   Pulse 82   Temp 98 °F (36.7 °C)   Ht 185.4 cm (73\")   Wt 79.4 kg (175 lb)   SpO2 98%   BMI 23.09 kg/m²   Estimated body mass index is 23.09 kg/m² as calculated from the following:    Height as of this encounter: 185.4 cm (73\").    Weight as of this encounter: 79.4 kg (175 lb).    Physical Exam  Vitals reviewed.   Constitutional:       Appearance: Normal appearance.   Cardiovascular:      Rate and Rhythm: Normal rate and regular rhythm.   Pulmonary:      Effort: Pulmonary effort is normal.   Abdominal:      General: Abdomen is flat.      Palpations: Abdomen is soft.   Musculoskeletal:      Comments: Moving all extremities spontaneously   Skin:     General: Skin is warm and dry.   Neurological:      Mental Status: He is alert.      Comments: Alert and oriented   Psychiatric:         Mood and Affect: Mood normal.         Behavior: Behavior normal.               Assessment and Plan     Diagnoses and all orders for this visit:    1. Moderate major depression  Assessment & Plan:  - PHQ-9 of 4, down from 12, improving, denies any SI/HI currently   - Patient did have a history of SI in the past and did end up going to the ER in the fall , denies any current SI  - Continue Lexapro 10 mg daily    Orders:  -     escitalopram (Lexapro) 10 " MG tablet; Take 1 tablet by mouth Daily.  Dispense: 90 tablet; Refill: 1    2. Insomnia, unspecified type  Assessment & Plan:  - Improving  - Continue trazodone 50 mg at bedtime    Orders:  -     traZODone (DESYREL) 50 MG tablet; Take 1 tablet by mouth Every Night.  Dispense: 90 tablet; Refill: 0    3. ROCIO (generalized anxiety disorder)  Assessment & Plan:  - ROCIO-7 of 7, stable, improving  - Continue Lexapro 10 mg daily and BuSpar as needed    Orders:  -     busPIRone (BUSPAR) 10 MG tablet; Take 1 tablet by mouth 3 (Three) Times a Day As Needed (anxiety).  Dispense: 90 tablet; Refill: 2      BMI is within normal parameters. No other follow-up for BMI required.    Follow Up  Return in about 4 months (around 8/29/2024) for anxiety, depression, and sleep.    Emma Mcknight MD

## 2024-04-29 NOTE — ASSESSMENT & PLAN NOTE
- PHQ-9 of 4, down from 12, improving, denies any SI/HI currently   - Patient did have a history of SI in the past and did end up going to the ER in the fall 2023, denies any current SI  - Continue Lexapro 10 mg daily

## 2024-05-05 LAB
TESTOST FREE SERPL-MCNC: 4.5 PG/ML (ref 6.8–21.5)
TESTOST SERPL-MCNC: 503 NG/DL (ref 264–916)

## 2024-05-06 ENCOUNTER — TELEPHONE (OUTPATIENT)
Dept: FAMILY MEDICINE CLINIC | Facility: CLINIC | Age: 49
End: 2024-05-06
Payer: COMMERCIAL

## 2024-05-06 DIAGNOSIS — R79.89 LOW TESTOSTERONE IN MALE: Primary | ICD-10-CM

## 2024-05-29 DIAGNOSIS — F32.1 MODERATE MAJOR DEPRESSION: ICD-10-CM

## 2024-05-29 RX ORDER — ESCITALOPRAM OXALATE 10 MG/1
10 TABLET ORAL DAILY
Qty: 90 TABLET | Refills: 1 | Status: SHIPPED | OUTPATIENT
Start: 2024-05-29

## 2024-05-29 NOTE — TELEPHONE ENCOUNTER
Caller: Nikita Pires    Relationship: Self    Best call back number: [phone]    Requested Prescriptions:   Requested Prescriptions     Pending Prescriptions Disp Refills    escitalopram (Lexapro) 10 MG tablet 90 tablet 1     Sig: Take 1 tablet by mouth Daily.        Pharmacy where request should be sent: [facility] [id number] [facility] [address] [phone] [facility] [phone]      Last office visit with prescribing clinician: 4/29/2024   Last telemedicine visit with prescribing clinician: Visit date not found   Next office visit with prescribing clinician: 8/29/2024     Additional details provided by patient:       Scar Brown Rep   05/29/24 13:10 EDT

## 2024-06-26 ENCOUNTER — TELEPHONE (OUTPATIENT)
Dept: FAMILY MEDICINE CLINIC | Facility: CLINIC | Age: 49
End: 2024-06-26
Payer: COMMERCIAL

## 2024-06-26 NOTE — TELEPHONE ENCOUNTER
Caller: Nikita Pires    Relationship: Self    Best call back number: [phone]     What is the best time to reach you: ANYTIME    Who are you requesting to speak with (clinical staff, provider,  specific staff member): PCP    Do you know the name of the person who called:     What was the call regarding: PATIENT WOULD LIKE A CALLBACK TO DISCUSS TEST RESULTS STATING HE HAS A LOW TESTERONE COUNT AND PATIENT WOULD LIKE TO KNOW IF THERE IS A MEDICATION THAT CAN BE TAKEN FOR THIS ISSUE    Is it okay if the provider responds through MyChart: PREFERS CALL

## 2024-11-04 ENCOUNTER — LAB (OUTPATIENT)
Dept: LAB | Facility: HOSPITAL | Age: 49
End: 2024-11-04
Payer: COMMERCIAL

## 2024-11-04 DIAGNOSIS — R79.89 LOW TESTOSTERONE IN MALE: ICD-10-CM

## 2024-11-04 PROCEDURE — 84402 ASSAY OF FREE TESTOSTERONE: CPT

## 2024-11-04 PROCEDURE — 84403 ASSAY OF TOTAL TESTOSTERONE: CPT

## 2024-11-04 PROCEDURE — 36415 COLL VENOUS BLD VENIPUNCTURE: CPT

## 2024-11-08 ENCOUNTER — TELEPHONE (OUTPATIENT)
Dept: FAMILY MEDICINE CLINIC | Facility: CLINIC | Age: 49
End: 2024-11-08
Payer: COMMERCIAL

## 2024-11-08 NOTE — TELEPHONE ENCOUNTER
Caller: Nikita Pires    Relationship: Self    Best call back number:      [phone] (Mobile)       What test was performed: BLOOD TEST ON 11-4-24      Additional notes: PLEASE CALL WITH TEST RESULTS

## 2024-11-09 LAB
TESTOST FREE SERPL-MCNC: 5.8 PG/ML (ref 6.8–21.5)
TESTOST SERPL-MCNC: 478 NG/DL (ref 264–916)

## 2024-11-11 DIAGNOSIS — R79.89 LOW TESTOSTERONE IN MALE: Primary | ICD-10-CM

## 2024-12-09 DIAGNOSIS — F32.1 MODERATE MAJOR DEPRESSION: ICD-10-CM

## 2024-12-09 RX ORDER — ESCITALOPRAM OXALATE 10 MG/1
10 TABLET ORAL DAILY
Qty: 90 TABLET | Refills: 1 | Status: SHIPPED | OUTPATIENT
Start: 2024-12-09

## 2024-12-09 NOTE — TELEPHONE ENCOUNTER
Caller: Nikita Pires    Relationship: Self    Best call back number:      [phone] (Mobile)     Requested Prescriptions:   Requested Prescriptions     Pending Prescriptions Disp Refills    escitalopram (Lexapro) 10 MG tablet 90 tablet 1     Sig: Take 1 tablet by mouth Daily.      Pharmacy where request should be sent:     [facility] [id number] [address] [phone] [facility] [phone]      Last office visit with prescribing clinician: 4/29/2024   Last telemedicine visit with prescribing clinician: Visit date not found   Next office visit with prescribing clinician: Visit date not found     Additional details provided by patient:     PATIENT STATED HE IS OUT OF THE MEDICATION    Does the patient have less than a 3 day supply:  [x] Yes  [] No    Would you like a call back once the refill request has been completed: [] Yes [] No    If the office needs to give you a call back, can they leave a voicemail: [] Yes [] No    Scar Angeles Rep   12/09/24 13:24 EST

## 2025-02-06 ENCOUNTER — TELEPHONE (OUTPATIENT)
Dept: FAMILY MEDICINE CLINIC | Facility: CLINIC | Age: 50
End: 2025-02-06
Payer: COMMERCIAL

## 2025-02-06 NOTE — TELEPHONE ENCOUNTER
Caller: Teetee Piresgenaro    Relationship: Self    Best call back number: [phone]    Requested Prescriptions:   Requested Prescriptions      No prescriptions requested or ordered in this encounter      escitalopram (Lexapro) 10 MG tablet     Pharmacy where request should be sent: [facility] [id number] [facility] [address] [facility] AT [address] [phone] [facility] [phone]      Last office visit with prescribing clinician: 4/29/2024   Last telemedicine visit with prescribing clinician: Visit date not found   Next office visit with prescribing clinician: Visit date not found     Does the patient have less than a 3 day supply:  [x] Yes  [] No    Would you like a call back once the refill request has been completed: [] Yes [x] No    If the office needs to give you a call back, can they leave a voicemail: [] Yes [x] No    Sara Lui, PCT   02/06/25 14:56 EST